# Patient Record
(demographics unavailable — no encounter records)

---

## 2024-11-06 NOTE — PROCEDURE
[FreeTextEntry6] : reason for exam: anterior rhinoscopy insufficient for symptom evaluation    Fiberoptic nasal endoscopy was performed.  R/b/a of procedure was explained to the patient and they agreed to proceed with procedure.  Significant factors noted: PURULENCE B/L OMC, extending to nasopharynx  Otherwise normal mucosa, normal b/l inferior, middle and superior turbinates, inferior, middle and superior meati and sphenoethmoidal recess.  No nasal polyps.  Septum DEVIATED RIGHT, CAUDAL DEFLECTION AS WELL, NP filled with purulent PND       scope #: 40

## 2024-11-06 NOTE — END OF VISIT
[FreeTextEntry3] : I personally saw and examined BRYAN CARTER  in detail. I spoke to SHANNAN Jacobs regarding the assessment and plan of care. I performed the procedures and relevant physical exam. I have made changes to the body of the note wherever necessary and appropriate.

## 2024-11-06 NOTE — CONSULT LETTER
[Dear  ___] : Dear  [unfilled], [Consult Letter:] : I had the pleasure of evaluating your patient, [unfilled]. [Consult Closing:] : Thank you very much for allowing me to participate in the care of this patient.  If you have any questions, please do not hesitate to contact me. [Sincerely,] : Sincerely, [FreeTextEntry3] : Rashmi Sosa MD Otolaryngology- Facial Plastics  [address] [place] [id number] (P) - [phone] (F) - [phone]

## 2024-11-06 NOTE — HISTORY OF PRESENT ILLNESS
[de-identified] : Mr. CARTER is a 21 year male referred by PCP for nasal congestion bilateral which comes and goes since February pt returned from [facility] in March, he started with nasal congestion in February while in [facility]. he was seen by PCP and started on Flonase and oral antihistamines 3 months ago and does feel it helps minimally.  - denies tx with antibiotics, steroids or prior imaging - denies fever, facial pressure - denies prior sinus infections, nasal complaints or surgery

## 2024-11-06 NOTE — ASSESSMENT
[FreeTextEntry1] : Mr. CARTER is a 21 year male with nasal congestion for more than 6 months, minimal improvement on Flonase and anti histamines. Scope shows DNS R with caudal deflection as well and purulence b/l OMC  - ENT culture L max - will call with results  - rx Augmentin 875 x 7 days, Medrol dose pack - will start twice daily sinus rinses with added Budesonide. Instructions and information provided, advised pt we will go through [facility] if meds are not covered - can stop Flonase once starting with Budesonide - f/u 1 month would consider CT sinus if this infection does not clear

## 2024-11-06 NOTE — PHYSICAL EXAM
[Nasal Endoscopy Performed] : nasal endoscopy was performed, see procedure section for findings [] : septum deviated to the right [Normal] : external appearance is normal [de-identified] : severe caudadl deflection right  [de-identified] : enlarged / nasal valve collapse + leah L side [de-identified] : copious amounts of purulent drainage noted

## 2024-12-11 NOTE — ASSESSMENT
[FreeTextEntry1] : Mr. CARTER is a 22 year male with still with nasal congestion despite tx with antibiotics and INS. Scope shows DNS R with caudal deflection, bilateral inferior turbinate hypertrophy and nasal valve collapse + leah b/l. OMC clear no evidence of infection seen today.   - CT sinus non con - eval for ch sinusitis pt considering surgery-if there is no fluid in the sinuses then no need for any further intervention in the sinuses.  However if there is any residual trapped fluid, would consider intervention at the same time as his septoplasty - c/w Flonase daily - can start tapering sinus rinses with Budesonide, he is traveling for 3 months advised to take the sinus rinse with him  - d/w patient r/b/a of septoplasty (will need some intervention at the caudal septum, possible swinging door, possible septal batten likely will not need full recon)/ turbinate reduction / nvr with spreaders ?FESS pending CT -He is leaving the country next month and returns March 2 we will plan for tentative surgical date for when he returns - d/w patient possibility of intranasal splint for 1 week - postoperative instructions were discussed with the patient including no aerobic exercise for 2 weeks, no heavy lifting for 2 weeks - risks of bleeding and septal perforation were discussed and all questions answered - photos to be taken no cosmetic changes planned Extensive discussion had regarding r/b/a of above procedure. all questions were answered

## 2024-12-11 NOTE — PHYSICAL EXAM
[Nasal Endoscopy Performed] : nasal endoscopy was performed, see procedure section for findings [] : septum deviated to the right [Normal] : no abnormal secretions [de-identified] : severe caudal deflection right  [de-identified] : enlarged / nasal valve collapse + leah bilateral

## 2024-12-11 NOTE — PROCEDURE
[FreeTextEntry6] : reason for exam: anterior rhinoscopy insufficient for symptom evaluation   Fiberoptic nasal endoscopy was performed.  R/b/a of procedure was explained to the patient and they agreed to proceed with procedure.  B/l inferior turbinate hypertrophy, severe with edematous mucosa.  Remainder of exam normal, including b/l middle turbinates, superior turbinates, inferior, middle and superior meati and sphenoethmoidal recess.  No nasal polyps.  Septum DEVIATED RIGHT, CAUDAL DEFLECTION AS WELL     scope #: 7

## 2024-12-11 NOTE — HISTORY OF PRESENT ILLNESS
[de-identified] : Mr. CARTER is a 21 year male referred by PCP for nasal congestion bilateral which comes and goes since February pt returned from [facility] in March, he started with nasal congestion in February while in [facility]. he was seen by PCP and started on Flonase and oral antihistamines 3 months ago and does feel it helps minimally.  - denies tx with antibiotics, steroids or prior imaging - denies fever, facial pressure - denies prior sinus infections, nasal complaints or surgery Last visit had nasal endoscopy which was consistent with severe right septal deviation as well as sinus infection with purulence.   treated with Augmentin, steroids and started on sinus rinse with budesonide.  Culture sent showed multiple josefina [FreeTextEntry1] : pt here for 1 month f/u he completed Augmentin, and steroids still using Flonase and has been using Budesonide sinus rinses daily for one month, he feels 70% better still with some congestion rinses are clear